# Patient Record
Sex: FEMALE | Race: OTHER | NOT HISPANIC OR LATINO | URBAN - METROPOLITAN AREA
[De-identification: names, ages, dates, MRNs, and addresses within clinical notes are randomized per-mention and may not be internally consistent; named-entity substitution may affect disease eponyms.]

---

## 2023-02-07 VITALS
OXYGEN SATURATION: 99 % | WEIGHT: 159.17 LBS | SYSTOLIC BLOOD PRESSURE: 167 MMHG | RESPIRATION RATE: 17 BRPM | TEMPERATURE: 97 F | HEART RATE: 52 BPM | HEIGHT: 66 IN | DIASTOLIC BLOOD PRESSURE: 73 MMHG

## 2023-02-07 NOTE — ASU PATIENT PROFILE, ADULT - NSICDXPASTMEDICALHX_GEN_ALL_CORE_FT
PAST MEDICAL HISTORY:  Cancer of breast left    Dyslipidemia     HTN (hypertension)     Hypothyroidism     Oral cellulitis     RA (rheumatoid arthritis)     TMJ (temporomandibular joint disorder)

## 2023-02-07 NOTE — ASU PATIENT PROFILE, ADULT - FALL HARM RISK - UNIVERSAL INTERVENTIONS
Bed in lowest position, wheels locked, appropriate side rails in place/Call bell, personal items and telephone in reach/Instruct patient to call for assistance before getting out of bed or chair/Non-slip footwear when patient is out of bed/Sims to call system/Physically safe environment - no spills, clutter or unnecessary equipment/Purposeful Proactive Rounding/Room/bathroom lighting operational, light cord in reach

## 2023-03-08 ENCOUNTER — OUTPATIENT (OUTPATIENT)
Dept: INPATIENT UNIT | Facility: HOSPITAL | Age: 68
LOS: 1 days | Discharge: HOME | End: 2023-03-08
Payer: MEDICARE

## 2023-03-08 VITALS
TEMPERATURE: 98 F | SYSTOLIC BLOOD PRESSURE: 146 MMHG | DIASTOLIC BLOOD PRESSURE: 67 MMHG | HEART RATE: 67 BPM | RESPIRATION RATE: 15 BRPM | OXYGEN SATURATION: 96 %

## 2023-03-08 DIAGNOSIS — Z98.890 OTHER SPECIFIED POSTPROCEDURAL STATES: Chronic | ICD-10-CM

## 2023-03-08 PROCEDURE — C1713: CPT

## 2023-03-08 PROCEDURE — 21248 RECONSTRUCTION OF JAW: CPT

## 2023-03-08 PROCEDURE — C1889: CPT

## 2023-03-08 DEVICE — IMPLANTABLE DEVICE: Type: IMPLANTABLE DEVICE | Status: FUNCTIONAL

## 2023-03-08 DEVICE — SURGICEL 4 X 8": Type: IMPLANTABLE DEVICE | Status: FUNCTIONAL

## 2023-03-08 DEVICE — SURGIFOAM PAD 8CM X 12.5CM X 10MM (100): Type: IMPLANTABLE DEVICE | Status: FUNCTIONAL

## 2023-03-08 DEVICE — MEMBRANE BIOMEND EXND 30X40: Type: IMPLANTABLE DEVICE | Status: FUNCTIONAL

## 2023-03-08 DEVICE — IMP ABUT RB/WB SCREW-RET ST 4.6X3.5MM TAN: Type: IMPLANTABLE DEVICE | Status: FUNCTIONAL

## 2023-03-08 DEVICE — LUKENS BONE WAX 2.5G: Type: IMPLANTABLE DEVICE | Status: FUNCTIONAL

## 2023-03-08 DEVICE — IMP SCREW RET ABUT ST GH 4.6X2.5MM TAN: Type: IMPLANTABLE DEVICE | Status: FUNCTIONAL

## 2023-03-08 DEVICE — IMP ROXOLID BLX RB SLACTIVE 4X12MM: Type: IMPLANTABLE DEVICE | Status: FUNCTIONAL

## 2023-03-08 RX ORDER — OXYCODONE HYDROCHLORIDE 5 MG/1
5 TABLET ORAL
Qty: 20 | Refills: 0
Start: 2023-03-08

## 2023-03-08 RX ORDER — MORPHINE SULFATE 50 MG/1
2 CAPSULE, EXTENDED RELEASE ORAL EVERY 4 HOURS
Refills: 0 | Status: DISCONTINUED | OUTPATIENT
Start: 2023-03-08 | End: 2023-03-08

## 2023-03-08 RX ORDER — IBUPROFEN 200 MG
30 TABLET ORAL
Qty: 0 | Refills: 0 | DISCHARGE
Start: 2023-03-08

## 2023-03-08 RX ORDER — LEVOTHYROXINE SODIUM 125 MCG
1 TABLET ORAL
Qty: 0 | Refills: 0 | DISCHARGE

## 2023-03-08 RX ORDER — MILK THISTLE 150 MG
1 CAPSULE ORAL
Qty: 0 | Refills: 0 | DISCHARGE

## 2023-03-08 RX ORDER — OXYCODONE HYDROCHLORIDE 5 MG/1
5 TABLET ORAL EVERY 6 HOURS
Refills: 0 | Status: DISCONTINUED | OUTPATIENT
Start: 2023-03-08 | End: 2023-03-08

## 2023-03-08 RX ORDER — IBUPROFEN 200 MG
1 TABLET ORAL
Qty: 0 | Refills: 0 | DISCHARGE

## 2023-03-08 RX ORDER — CHLORHEXIDINE GLUCONATE 213 G/1000ML
15 SOLUTION TOPICAL
Qty: 420 | Refills: 0
Start: 2023-03-08 | End: 2023-03-21

## 2023-03-08 RX ORDER — IBUPROFEN 200 MG
1 TABLET ORAL
Qty: 28 | Refills: 0
Start: 2023-03-08 | End: 2023-03-14

## 2023-03-08 RX ORDER — AMOXICILLIN 250 MG/5ML
1 SUSPENSION, RECONSTITUTED, ORAL (ML) ORAL
Qty: 21 | Refills: 0
Start: 2023-03-08 | End: 2023-03-14

## 2023-03-08 RX ORDER — IBUPROFEN 200 MG
600 TABLET ORAL EVERY 6 HOURS
Refills: 0 | Status: DISCONTINUED | OUTPATIENT
Start: 2023-03-08 | End: 2023-03-08

## 2023-03-08 RX ORDER — NEBIVOLOL HYDROCHLORIDE 5 MG/1
1 TABLET ORAL
Qty: 0 | Refills: 0 | DISCHARGE

## 2023-03-08 RX ORDER — OXYCODONE HYDROCHLORIDE 5 MG/1
10 TABLET ORAL EVERY 6 HOURS
Refills: 0 | Status: DISCONTINUED | OUTPATIENT
Start: 2023-03-08 | End: 2023-03-08

## 2023-03-08 RX ORDER — CHOLECALCIFEROL (VITAMIN D3) 125 MCG
1 CAPSULE ORAL
Qty: 0 | Refills: 0 | DISCHARGE

## 2023-03-08 RX ADMIN — OXYCODONE HYDROCHLORIDE 5 MILLIGRAM(S): 5 TABLET ORAL at 15:25

## 2023-03-08 RX ADMIN — OXYCODONE HYDROCHLORIDE 5 MILLIGRAM(S): 5 TABLET ORAL at 15:07

## 2023-03-08 RX ADMIN — MORPHINE SULFATE 2 MILLIGRAM(S): 50 CAPSULE, EXTENDED RELEASE ORAL at 13:35

## 2023-03-08 RX ADMIN — MORPHINE SULFATE 2 MILLIGRAM(S): 50 CAPSULE, EXTENDED RELEASE ORAL at 13:50

## 2023-03-08 RX ADMIN — OXYCODONE HYDROCHLORIDE 5 MILLIGRAM(S): 5 TABLET ORAL at 14:52

## 2023-03-08 NOTE — H&P ADULT - NSICDXPASTMEDICALHX_GEN_ALL_CORE_FT
PAST MEDICAL HISTORY:  Cancer of breast left    Dyslipidemia     HTN (hypertension)     Hypothyroidism     Oral cellulitis     RA (rheumatoid arthritis)     TMJ (temporomandibular joint disorder)      same name as above

## 2023-03-08 NOTE — PRE-ANESTHESIA EVALUATION ADULT - NSANTHADDINFOFT_GEN_ALL_CORE
anesthesia procedure reviewed in detail. pt. safety emphasized and reasonable risks reviewed .The pt. is in optimal condition for the anticipated surgery.

## 2023-03-08 NOTE — H&P ADULT - ASSESSMENT
67 year old female with past medical history significant for HLD, Hypothyroid, HTN and breast cancer presents for debridement of the mandible and maxilla today in the OR.

## 2023-03-08 NOTE — BRIEF OPERATIVE NOTE - NSICDXBRIEFPROCEDURE_GEN_ALL_CORE_FT
PROCEDURES:  Tooth extraction 10-Mar-2023 15:11:20  Sudheer Maldonado  Alveoloplasty 10-Mar-2023 15:11:28  Sudheer Maldonado  Insertion of dental zygomatic implant into zygomatic bone 10-Mar-2023 15:11:39  Sudheer Maldonado  Insertion of osseointegrated dental implant 10-Mar-2023 15:11:52  Sudheer Maldonado

## 2023-03-08 NOTE — ASU DISCHARGE PLAN (ADULT/PEDIATRIC) - PROCEDURE
Extraction of multiple teeth, alveoloplasty, placement of two zygomatic implants, and dental implants

## 2023-03-08 NOTE — BRIEF OPERATIVE NOTE - OPERATION/FINDINGS
Full-mouth extraction of teeth (#5, 6, 8, 9, 10, 12, 13, 21, 22, 23, 25, 26, 27, 28), maxillary and mandibular alveoloplasties, [facility], placement of 10 dental implants

## 2023-03-08 NOTE — ASU DISCHARGE PLAN (ADULT/PEDIATRIC) - CARE PROVIDER_API CALL
Gab Mead (DDS; MD)  Oral[facility]illofacial Surgery  [address] [address]  Phone: [phone]  Fax: [phone]  Scheduled Appointment: 03/14/2023

## 2023-03-08 NOTE — ASU DISCHARGE PLAN (ADULT/PEDIATRIC) - SPECIFY DIET AND FLUID
Follow a full-liquid diet for the first 24 hours and then advance as tolerated but avoid hard, crunchy, or hot foods for two 2 weeks.

## (undated) DEVICE — PACK UPPER BODY

## (undated) DEVICE — Device

## (undated) DEVICE — BUR STRYKER CARBIDE ROUND 4MM

## (undated) DEVICE — SYR LUER LOK 50CC

## (undated) DEVICE — BUR STRYKER ROUND CARBIDE 5MM

## (undated) DEVICE — URETERAL CATH RED RUBBER 10FR (BLACK)

## (undated) DEVICE — GLV 7.5 PROTEXIS (WHITE)

## (undated) DEVICE — STAPLER SKIN PROXIMATE